# Patient Record
Sex: MALE | Race: WHITE
[De-identification: names, ages, dates, MRNs, and addresses within clinical notes are randomized per-mention and may not be internally consistent; named-entity substitution may affect disease eponyms.]

---

## 2022-06-06 ENCOUNTER — HOSPITAL ENCOUNTER (INPATIENT)
Dept: HOSPITAL 94 - ER | Age: 67
LOS: 1 days | Discharge: HOME | DRG: 101 | End: 2022-06-07
Attending: HOSPITALIST | Admitting: SPECIALIST
Payer: MEDICARE

## 2022-06-06 VITALS — HEIGHT: 72 IN | BODY MASS INDEX: 21.5 KG/M2 | WEIGHT: 158.73 LBS

## 2022-06-06 DIAGNOSIS — Z20.822: ICD-10-CM

## 2022-06-06 DIAGNOSIS — E86.0: ICD-10-CM

## 2022-06-06 DIAGNOSIS — N17.9: ICD-10-CM

## 2022-06-06 DIAGNOSIS — Z79.899: ICD-10-CM
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
## 2022-06-06 DIAGNOSIS — Z23: ICD-10-CM

## 2022-06-06 DIAGNOSIS — Z71.51: ICD-10-CM

## 2022-06-06 DIAGNOSIS — G40.802: Primary | ICD-10-CM

## 2022-06-06 DIAGNOSIS — E03.9: ICD-10-CM
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
## 2022-06-06 DIAGNOSIS — Z90.49: ICD-10-CM
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
## 2022-06-06 DIAGNOSIS — F15.10: ICD-10-CM

## 2022-06-06 DIAGNOSIS — S01.81XA: ICD-10-CM

## 2022-06-06 LAB
ALBUMIN SERPL BCP-MCNC: 4.1 G/DL (ref 3.4–5)
ALBUMIN/GLOB SERPL: 1.1 {RATIO} (ref 1.1–1.5)
ALP SERPL-CCNC: 66 IU/L (ref 46–116)
ALT SERPL W P-5'-P-CCNC: 28 U/L (ref 12–78)
AMPHETAMINES UR QL SCN: POSITIVE
ANION GAP SERPL CALCULATED.3IONS-SCNC: 10 MMOL/L (ref 8–16)
AST SERPL W P-5'-P-CCNC: 33 U/L (ref 10–37)
BACTERIA URNS QL MICRO: (no result) /HPF
BARBITURATES UR QL SCN: NEGATIVE
BASOPHILS # BLD AUTO: 0 X10'3 (ref 0–0.2)
BASOPHILS NFR BLD AUTO: 0.5 % (ref 0–1)
BENZODIAZ UR QL SCN: NEGATIVE
BILIRUB SERPL-MCNC: 0.4 MG/DL (ref 0.1–1)
BUN SERPL-MCNC: 18 MG/DL (ref 7–18)
BUN/CREAT SERPL: 12.2 (ref 5.4–32)
BZE UR QL SCN: NEGATIVE
CALCIUM SERPL-MCNC: 8.6 MG/DL (ref 8.5–10.1)
CANNABINOIDS UR QL SCN: POSITIVE
CHLORIDE SERPL-SCNC: 101 MMOL/L (ref 99–107)
CLARITY UR: CLEAR
CO2 SERPL-SCNC: 26.8 MMOL/L (ref 24–32)
COLOR UR: YELLOW
CREAT SERPL-MCNC: 1.47 MG/DL (ref 0.6–1.1)
DEPRECATED SQUAMOUS URNS QL MICRO: (no result) /LPF
EOSINOPHIL # BLD AUTO: 0.1 X10'3 (ref 0–0.9)
EOSINOPHIL NFR BLD AUTO: 1.2 % (ref 0–6)
ERYTHROCYTE [DISTWIDTH] IN BLOOD BY AUTOMATED COUNT: 13.2 % (ref 11.5–14.5)
ETHANOL SERPL-MCNC: < 0.01 GM/DL (ref 0–0.01)
GFR SERPL CREATININE-BSD FRML MDRD: 48 ML/MIN
GLUCOSE SERPL-MCNC: 181 MG/DL (ref 70–104)
GLUCOSE UR STRIP-MCNC: NEGATIVE MG/DL
HCT VFR BLD AUTO: 41.5 % (ref 42–52)
HGB BLD-MCNC: 14.1 G/DL (ref 14–17.9)
HGB UR QL STRIP: NEGATIVE
KETONES UR STRIP-MCNC: (no result) MG/DL
LEUKOCYTE ESTERASE UR QL STRIP: NEGATIVE
LYMPHOCYTES # BLD AUTO: 0.7 X10'3 (ref 1.1–4.8)
LYMPHOCYTES NFR BLD AUTO: 10.9 % (ref 21–51)
MAGNESIUM SERPL-MCNC: 1.5 MG/DL (ref 1.5–2.4)
MCH RBC QN AUTO: 31.9 PG (ref 27–31)
MCHC RBC AUTO-ENTMCNC: 34 G/DL (ref 33–36.5)
MCV RBC AUTO: 93.8 FL (ref 78–98)
METHADONE UR QL SCN: NEGATIVE
MONOCYTES # BLD AUTO: 0.4 X10'3 (ref 0–0.9)
MONOCYTES NFR BLD AUTO: 5.8 % (ref 2–12)
NEUTROPHILS # BLD AUTO: 5.2 X10'3 (ref 1.8–7.7)
NEUTROPHILS NFR BLD AUTO: 81.6 % (ref 42–75)
NITRITE UR QL STRIP: NEGATIVE
OPIATES UR QL SCN: NEGATIVE
PCP UR QL SCN: NEGATIVE
PH UR STRIP: 7 [PH] (ref 4.8–8)
PLATELET # BLD AUTO: 227 X10'3 (ref 140–440)
PMV BLD AUTO: 7.2 FL (ref 7.4–10.4)
POTASSIUM SERPL-SCNC: 3.7 MMOL/L (ref 3.5–5.1)
POTASSIUM SERPL-SCNC: 4 MMOL/L (ref 3.5–5.1)
PROT SERPL-MCNC: 7.8 G/DL (ref 6.4–8.2)
PROT UR QL STRIP: 100 MG/DL
RBC # BLD AUTO: 4.43 X10'6 (ref 4.7–6.1)
RBC #/AREA URNS HPF: (no result) /HPF (ref 0–2)
SODIUM SERPL-SCNC: 138 MMOL/L (ref 135–145)
SP GR UR STRIP: 1.02 (ref 1–1.03)
URN COLLECT METHOD CLASS: (no result)
UROBILINOGEN UR STRIP-MCNC: 0.2 E.U/DL (ref 0.2–1)
VALPROATE SERPL-MCNC: 20 UG/ML (ref 50–100)
WBC # BLD AUTO: 6.4 X10'3 (ref 4.5–11)
WBC #/AREA URNS HPF: (no result) /HPF (ref 0–4)

## 2022-06-06 PROCEDURE — 70450 CT HEAD/BRAIN W/O DYE: CPT

## 2022-06-06 PROCEDURE — 90715 TDAP VACCINE 7 YRS/> IM: CPT

## 2022-06-06 PROCEDURE — 96365 THER/PROPH/DIAG IV INF INIT: CPT

## 2022-06-06 PROCEDURE — 80053 COMPREHEN METABOLIC PANEL: CPT

## 2022-06-06 PROCEDURE — 80320 DRUG SCREEN QUANTALCOHOLS: CPT

## 2022-06-06 PROCEDURE — 90471 IMMUNIZATION ADMIN: CPT

## 2022-06-06 PROCEDURE — 99285 EMERGENCY DEPT VISIT HI MDM: CPT

## 2022-06-06 PROCEDURE — 36415 COLL VENOUS BLD VENIPUNCTURE: CPT

## 2022-06-06 PROCEDURE — 3E0234Z INTRODUCTION OF SERUM, TOXOID AND VACCINE INTO MUSCLE, PERCUTANEOUS APPROACH: ICD-10-PCS | Performed by: SPECIALIST

## 2022-06-06 PROCEDURE — 80305 DRUG TEST PRSMV DIR OPT OBS: CPT

## 2022-06-06 PROCEDURE — 83735 ASSAY OF MAGNESIUM: CPT

## 2022-06-06 PROCEDURE — 80164 ASSAY DIPROPYLACETIC ACD TOT: CPT

## 2022-06-06 PROCEDURE — 81001 URINALYSIS AUTO W/SCOPE: CPT

## 2022-06-06 PROCEDURE — 0HQ1XZZ REPAIR FACE SKIN, EXTERNAL APPROACH: ICD-10-PCS | Performed by: INTERNAL MEDICINE

## 2022-06-06 PROCEDURE — 71045 X-RAY EXAM CHEST 1 VIEW: CPT

## 2022-06-06 PROCEDURE — 72125 CT NECK SPINE W/O DYE: CPT

## 2022-06-06 PROCEDURE — 70486 CT MAXILLOFACIAL W/O DYE: CPT

## 2022-06-06 PROCEDURE — 84132 ASSAY OF SERUM POTASSIUM: CPT

## 2022-06-06 PROCEDURE — 85025 COMPLETE CBC W/AUTO DIFF WBC: CPT

## 2022-06-06 PROCEDURE — 12011 RPR F/E/E/N/L/M 2.5 CM/<: CPT

## 2022-06-06 RX ADMIN — LEVETIRACETAM SCH MLS/HR: 100 INJECTION, SOLUTION, CONCENTRATE INTRAVENOUS at 20:55

## 2022-06-06 NOTE — NUR
PT'S FRIEND ZAHNNA CALLED FOR AN UPDATE. RN CONFIRMED WITH PT THAT HE WAS OKAY 
WITH ZHANNA RECIEVING UPDATE. ZHANNA LEFT TWO PHONE NUMBERS FOR TO CALL FOR UPDATES 
AS WELL AS A RIDE HOME WHEN PT IS ELIGIBLE FOR DISCHARGE



ZHANNA (FRIEND) [phone] 

CAROL (ROOMMATE) [phone]

## 2022-06-06 NOTE — NUR
THIS RN WITNESSED 20 SECOND SEIZURE WITH YESENIA HOUSTON IN ROOM RIGHT AFTER SHE 
TOLD THE PT HE WAS CLEARED FOR DISCHARGE. PT IS UNRESPONSIVE. VITAL SIGNS ARE 
STABLE AND WERE STABLE THROUGHOUT SEIZURE. 

-------------------------------------------------------------------------------

Addendum: 06/06/22 at 1953 by REFUGIO

-------------------------------------------------------------------------------

PT WILL NOW BE ADMITTED AND IS GETTING FOLLOW UP CHEST X RAY AND EKG

## 2022-06-06 NOTE — NUR
PT IS AWAKE, ALERT AND ORIENTED TO PERSON BUT NOT PLACE OR EVENT. HE DOES NOT 
REMEMBER HAVING ANY SEIZURES TODAY AND STATED THAT [facility] ARE [facility] 
[facility].

## 2022-06-06 NOTE — NUR
WAS IN TRAIGE WHEN I HEARD GRUNTING LOOKED OUT THE WINDOW OF ADMITING AND SAW 
PT HAVING A SEIZURE WENT OUT TO LOBBY TO ASSIST PT BUT BEFORE I WAS ABLE TO GET 
OUT TO HIM HE FELL OVER AND HIT HIS HEAD AGINST THE WALL  PT CONT TO HAVE 
SEIZURE  I RULLED HIM ON TO HIS SIDE AND LIFTED HIS HEAD TO PROTECT HIS AIR WAY 
PT STOPPED SEIZING AFTER ABOUT 60 SECONDS    PT WAS THEN POSTTICAL  CONTIUNED 
TO HOLD HEAD TO KEEP AIR WAY OPEN AFTER GUJONN ARRIVED WITH ASSISTNACE WE 
LIFTED PT TO THE [facility] AND AT THIS TIME PT STARTED TO WAKE UP AND RESPOND  PT 
WAS THEM TAKEN TO ROOM 8 IN THE ER

## 2022-06-06 NOTE — NUR
PT PRESENTING POSTICTAL. UNABLED TO ANSWER SIMPLE YES OR NO QUESTIONS AT THIS 
TIME BUT DOES AWAKEN TO NAME AND ASKS "WHAT?" PT WAS UNABLE TO RESPOND TO 
QUESTIONS SUCH AS "DO YOU WANT A BLANKET?" PT SIMPLY STARED AT NURSE

## 2022-06-07 VITALS — SYSTOLIC BLOOD PRESSURE: 155 MMHG | DIASTOLIC BLOOD PRESSURE: 65 MMHG

## 2022-06-07 LAB
ALBUMIN SERPL BCP-MCNC: 3.4 G/DL (ref 3.4–5)
ALBUMIN/GLOB SERPL: 1 {RATIO} (ref 1.1–1.5)
ALP SERPL-CCNC: 66 IU/L (ref 46–116)
ALT SERPL W P-5'-P-CCNC: 29 U/L (ref 12–78)
ANION GAP SERPL CALCULATED.3IONS-SCNC: 8 MMOL/L (ref 8–16)
AST SERPL W P-5'-P-CCNC: 60 U/L (ref 10–37)
BILIRUB SERPL-MCNC: 0.6 MG/DL (ref 0.1–1)
BUN SERPL-MCNC: 13 MG/DL (ref 7–18)
BUN/CREAT SERPL: 11.7 (ref 5.4–32)
CALCIUM SERPL-MCNC: 8.4 MG/DL (ref 8.5–10.1)
CHLORIDE SERPL-SCNC: 103 MMOL/L (ref 99–107)
CO2 SERPL-SCNC: 26.9 MMOL/L (ref 24–32)
CREAT SERPL-MCNC: 1.11 MG/DL (ref 0.6–1.1)
GFR SERPL CREATININE-BSD FRML MDRD: 66 ML/MIN
GLUCOSE SERPL-MCNC: 94 MG/DL (ref 70–104)
MAGNESIUM SERPL-MCNC: 1.6 MG/DL (ref 1.5–2.4)
POTASSIUM SERPL-SCNC: 4.1 MMOL/L (ref 3.5–5.1)
PROT SERPL-MCNC: 6.9 G/DL (ref 6.4–8.2)
SODIUM SERPL-SCNC: 138 MMOL/L (ref 135–145)

## 2022-06-07 RX ADMIN — LEVETIRACETAM SCH MLS/HR: 100 INJECTION, SOLUTION, CONCENTRATE INTRAVENOUS at 08:00
